# Patient Record
Sex: FEMALE | Race: BLACK OR AFRICAN AMERICAN
[De-identification: names, ages, dates, MRNs, and addresses within clinical notes are randomized per-mention and may not be internally consistent; named-entity substitution may affect disease eponyms.]

---

## 2018-03-03 ENCOUNTER — HOSPITAL ENCOUNTER (OUTPATIENT)
Dept: HOSPITAL 17 - NEPC | Age: 43
Setting detail: OBSERVATION
LOS: 1 days | Discharge: PSYCHIATRIC OR SUBSTANCE USE FACILITY | End: 2018-03-04
Payer: COMMERCIAL

## 2018-03-03 VITALS
HEART RATE: 75 BPM | SYSTOLIC BLOOD PRESSURE: 120 MMHG | DIASTOLIC BLOOD PRESSURE: 72 MMHG | OXYGEN SATURATION: 98 % | RESPIRATION RATE: 16 BRPM

## 2018-03-03 VITALS
HEART RATE: 75 BPM | TEMPERATURE: 98.5 F | RESPIRATION RATE: 18 BRPM | SYSTOLIC BLOOD PRESSURE: 121 MMHG | OXYGEN SATURATION: 96 % | DIASTOLIC BLOOD PRESSURE: 82 MMHG

## 2018-03-03 VITALS
TEMPERATURE: 98.2 F | DIASTOLIC BLOOD PRESSURE: 73 MMHG | SYSTOLIC BLOOD PRESSURE: 123 MMHG | OXYGEN SATURATION: 95 % | HEART RATE: 69 BPM | RESPIRATION RATE: 18 BRPM

## 2018-03-03 VITALS — WEIGHT: 198.42 LBS | HEIGHT: 65 IN | BODY MASS INDEX: 33.06 KG/M2

## 2018-03-03 VITALS — OXYGEN SATURATION: 98 %

## 2018-03-03 DIAGNOSIS — R06.02: ICD-10-CM

## 2018-03-03 DIAGNOSIS — F17.210: ICD-10-CM

## 2018-03-03 DIAGNOSIS — I44.0: ICD-10-CM

## 2018-03-03 DIAGNOSIS — R94.31: ICD-10-CM

## 2018-03-03 DIAGNOSIS — Z85.43: ICD-10-CM

## 2018-03-03 DIAGNOSIS — R20.0: ICD-10-CM

## 2018-03-03 DIAGNOSIS — Z91.5: ICD-10-CM

## 2018-03-03 DIAGNOSIS — F43.21: ICD-10-CM

## 2018-03-03 DIAGNOSIS — F12.90: ICD-10-CM

## 2018-03-03 DIAGNOSIS — R45.851: ICD-10-CM

## 2018-03-03 DIAGNOSIS — R07.89: Primary | ICD-10-CM

## 2018-03-03 LAB
ALBUMIN SERPL-MCNC: 3.8 GM/DL (ref 3.4–5)
ALP SERPL-CCNC: 82 U/L (ref 45–117)
ALT SERPL-CCNC: 17 U/L (ref 10–53)
APAP SERPL-MCNC: (no result) MCG/ML (ref 10–30)
AST SERPL-CCNC: 13 U/L (ref 15–37)
BASOPHILS # BLD AUTO: 0 TH/MM3 (ref 0–0.2)
BASOPHILS NFR BLD: 0.8 % (ref 0–2)
BILIRUB SERPL-MCNC: 0.5 MG/DL (ref 0.2–1)
BUN SERPL-MCNC: 10 MG/DL (ref 7–18)
CALCIUM SERPL-MCNC: 9.1 MG/DL (ref 8.5–10.1)
CHLORIDE SERPL-SCNC: 106 MEQ/L (ref 98–107)
CREAT SERPL-MCNC: 0.78 MG/DL (ref 0.5–1)
EOSINOPHIL # BLD: 0.1 TH/MM3 (ref 0–0.4)
EOSINOPHIL NFR BLD: 1.7 % (ref 0–4)
ERYTHROCYTE [DISTWIDTH] IN BLOOD BY AUTOMATED COUNT: 14.4 % (ref 11.6–17.2)
GFR SERPLBLD BASED ON 1.73 SQ M-ARVRAT: 97 ML/MIN (ref 89–?)
GLUCOSE SERPL-MCNC: 81 MG/DL (ref 74–106)
HCO3 BLD-SCNC: 22.8 MEQ/L (ref 21–32)
HCT VFR BLD CALC: 47.5 % (ref 35–46)
HGB BLD-MCNC: 15.9 GM/DL (ref 11.6–15.3)
LYMPHOCYTES # BLD AUTO: 1.7 TH/MM3 (ref 1–4.8)
LYMPHOCYTES NFR BLD AUTO: 33.4 % (ref 9–44)
MCH RBC QN AUTO: 28.9 PG (ref 27–34)
MCHC RBC AUTO-ENTMCNC: 33.6 % (ref 32–36)
MCV RBC AUTO: 86.1 FL (ref 80–100)
MONOCYTE #: 0.4 TH/MM3 (ref 0–0.9)
MONOCYTES NFR BLD: 8.6 % (ref 0–8)
NEUTROPHILS # BLD AUTO: 2.8 TH/MM3 (ref 1.8–7.7)
NEUTROPHILS NFR BLD AUTO: 55.5 % (ref 16–70)
PLATELET # BLD: 370 TH/MM3 (ref 150–450)
PMV BLD AUTO: 7.9 FL (ref 7–11)
PROT SERPL-MCNC: 8.4 GM/DL (ref 6.4–8.2)
RBC # BLD AUTO: 5.52 MIL/MM3 (ref 4–5.3)
SODIUM SERPL-SCNC: 136 MEQ/L (ref 136–145)
TROPONIN I SERPL-MCNC: (no result) NG/ML (ref 0.02–0.05)
TROPONIN I SERPL-MCNC: (no result) NG/ML (ref 0.02–0.05)
WBC # BLD AUTO: 5.1 TH/MM3 (ref 4–11)

## 2018-03-03 PROCEDURE — 80307 DRUG TEST PRSMV CHEM ANLYZR: CPT

## 2018-03-03 PROCEDURE — 80053 COMPREHEN METABOLIC PANEL: CPT

## 2018-03-03 PROCEDURE — 71045 X-RAY EXAM CHEST 1 VIEW: CPT

## 2018-03-03 PROCEDURE — 85025 COMPLETE CBC W/AUTO DIFF WBC: CPT

## 2018-03-03 PROCEDURE — 84702 CHORIONIC GONADOTROPIN TEST: CPT

## 2018-03-03 PROCEDURE — 84484 ASSAY OF TROPONIN QUANT: CPT

## 2018-03-03 PROCEDURE — 99285 EMERGENCY DEPT VISIT HI MDM: CPT

## 2018-03-03 PROCEDURE — 93005 ELECTROCARDIOGRAM TRACING: CPT

## 2018-03-03 PROCEDURE — G0378 HOSPITAL OBSERVATION PER HR: HCPCS

## 2018-03-03 PROCEDURE — 93017 CV STRESS TEST TRACING ONLY: CPT

## 2018-03-03 PROCEDURE — 84443 ASSAY THYROID STIM HORMONE: CPT

## 2018-03-03 PROCEDURE — 82550 ASSAY OF CK (CPK): CPT

## 2018-03-03 NOTE — RADRPT
EXAM DATE/TIME:  03/03/2018 18:08 

 

[facility] COMPARISON:     

No previous studies available for comparison.

 

                     

INDICATIONS :     

Patient complains of chest pain and shortness of breath. 

                     

 

MEDICAL HISTORY :     

None.          

 

SURGICAL HISTORY :     

None.   

 

ENCOUNTER:     

Initial                                        

 

ACUITY:     

1 day      

 

PAIN SCORE:     

4/10

 

LOCATION:      

chest 

 

FINDINGS:     

A single view of the chest demonstrates the lungs to be symmetrically aerated without evidence of mas
s, infiltrate or effusion.  The cardiomediastinal contours are unremarkable.  Osseous structures are 
intact.

 

CONCLUSION:     No acute disease.  

 

 

 

 Aditya Salazar MD on March 03, 2018 at 18:54           

Board Certified Radiologist.

 This report was verified electronically.

## 2018-03-03 NOTE — PD
Data


Data


Last Documented VS





Vital Signs








  Date Time  Temp Pulse Resp B/P (MAP) Pulse Ox O2 Delivery O2 Flow Rate FiO2


 


3/3/18 19:26  75 16 120/72 (88) 98 Room Air  


 


3/3/18 17:24 98.5       








Orders





 Orders


Complete Blood Count With Diff (3/3/18 17:24)


Comprehensive Metabolic Panel (3/3/18 17:24)


Thyroid Stimulating Hormone (3/3/18 17:24)


Electrocardiogram (3/3/18 17:24)


Oximetry (3/3/18 17:24)


Ecg Monitoring (3/3/18 17:24)


Beta Hcg (Quant/Titer) (3/3/18 17:24)


Psych Screen (3/3/18 17:24)


Drug Screen, Random Urine (3/3/18 17:24)


Alcohol (Ethanol) (3/3/18 17:24)


Salicylates (Aspirin) (3/3/18 17:24)


Tylenol (Acetaminophen) (3/3/18 17:24)


Chest, Single Ap (3/3/18 17:43)


Aspirin Chew (Aspirin Chew) (3/3/18 19:15)


Ckmb (Isoenzyme) Profile (3/3/18 19:05)


Troponin I (3/3/18 19:05)


Admit Order (Ed Use Only) (3/3/18 20:03)





Labs





Laboratory Tests








Test


  3/3/18


17:30 3/3/18


18:25 3/3/18


19:05


 


White Blood Count 5.1 TH/MM3   


 


Red Blood Count 5.52 MIL/MM3   


 


Hemoglobin 15.9 GM/DL   


 


Hematocrit 47.5 %   


 


Mean Corpuscular Volume 86.1 FL   


 


Mean Corpuscular Hemoglobin 28.9 PG   


 


Mean Corpuscular Hemoglobin


Concent 33.6 % 


  


  


 


 


Red Cell Distribution Width 14.4 %   


 


Platelet Count 370 TH/MM3   


 


Mean Platelet Volume 7.9 FL   


 


Neutrophils (%) (Auto) 55.5 %   


 


Lymphocytes (%) (Auto) 33.4 %   


 


Monocytes (%) (Auto) 8.6 %   


 


Eosinophils (%) (Auto) 1.7 %   


 


Basophils (%) (Auto) 0.8 %   


 


Neutrophils # (Auto) 2.8 TH/MM3   


 


Lymphocytes # (Auto) 1.7 TH/MM3   


 


Monocytes # (Auto) 0.4 TH/MM3   


 


Eosinophils # (Auto) 0.1 TH/MM3   


 


Basophils # (Auto) 0.0 TH/MM3   


 


CBC Comment DIFF FINAL   


 


Differential Comment    


 


Urine Opiates Screen  NEG  


 


Urine Barbiturates Screen  NEG  


 


Urine Amphetamines Screen  NEG  


 


Urine Benzodiazepines Screen  NEG  


 


Urine Cocaine Screen  NEG  


 


Urine Cannabinoids Screen  POS  


 


Blood Urea Nitrogen   10 MG/DL 


 


Creatinine   0.78 MG/DL 


 


Random Glucose   81 MG/DL 


 


Total Protein   8.4 GM/DL 


 


Albumin   3.8 GM/DL 


 


Calcium Level   9.1 MG/DL 


 


Alkaline Phosphatase   82 U/L 


 


Aspartate Amino Transf


(AST/SGOT) 


  


  13 U/L 


 


 


Alanine Aminotransferase


(ALT/SGPT) 


  


  17 U/L 


 


 


Total Bilirubin   0.5 MG/DL 


 


Sodium Level   136 MEQ/L 


 


Potassium Level   3.9 MEQ/L 


 


Chloride Level   106 MEQ/L 


 


Carbon Dioxide Level   22.8 MEQ/L 


 


Anion Gap   7 MEQ/L 


 


Estimat Glomerular Filtration


Rate 


  


  97 ML/MIN 


 


 


Total Creatine Kinase   91 U/L 


 


Troponin I


  


  


  LESS THAN 0.02


NG/ML


 


Thyroid Stimulating Hormone


3rd Gen 


  


  1.390 uIU/ML 


 


 


Human Chorionic Gonadotropin,


Quant 


  


  2 MIU/ML 


 


 


Salicylates Level   1.8 MG/DL 


 


Acetaminophen Level


  


  


  LESS THAN 2.0


MCG/ML


 


Ethyl Alcohol Level


  


  


  LESS THAN 3


MG/DL











MDM


Supervised Visit with ERMIAS:  No


Narrative Course


The patient was initially evaluated by the previous provider and signed out to 

me at the beginning of my shift pending labs and disposition.  He is no for 

further details.





Briefly this is a 42-year-old female who is brought in under Baker act, however 

is also complained of chest pain.  According to the [place] act the patient 

stated that she was done with it all and wanted to die.  The patient is denying 

suicidal ideation.  She states that she was in an argument with her boyfriend, 

and stated that she felt tired.  She has no thoughts of suicide.  She states 

that at the time she was having substernal chest pressure, left arm heaviness 

and left arm numbness.  She denies history of cardiac disease.  The previous 

provider felt that if her cardiac workup was negative, that she should be 

admitted to the chest pain center for further cardiac evaluation.  Patient's 

EKG shows no signs of ischemia.  CBC shows slight hemoconcentration with a 

hemoglobin of 15.9, CMP is unremarkable, cardiac enzymes are negative.  Chest x-

ray shows no acute disease.  The patient was given aspirin and was made aware 

of all findings.  She is amenable to plan to being admitted to the chest pain 

center.


Diagnosis





 Primary Impression:  


 Chest pain rule out MI


 Additional Impression:  


 [place] act





Admitting Information


Admitting Physician Requests:  Observation











Spencer Chance MD Mar 3, 2018 20:05

## 2018-03-03 NOTE — PD
HPI


Chief Complaint:  Chest Pain


Time Seen by Provider:  17:24


Travel History


International Travel<30 days:  No


Contact w/Intl Traveler<30days:  No


Traveled to known affect area:  No





History of Present Illness


HPI


Patient is brought in as a baker act by rowan due to a call boyfriend made, 

in which she alleged that she could not take this anymore and she wanted to 

die.  Based on those comments 's went ahead and [place] acted the patient, 

patient is a legal gun carrier and there was a gone and glove compartment of 

the vehicle.  During transportation the patient started to complain of chest 

pain substernal and radiated to left arm, 5 out of 10, currently patient is 

symptom-free.











No known drug allergy


The current primary care physician


Previous appendectomy, history of depression, history of suicide attempt back 

in at least about 10 years ago on pain pills, history of violent behavior as 

well.





PFSH


Past Medical History


Depression:  Yes


Diminished Hearing:  No





Past Surgical History


Appendectomy:  Yes


Ear Surgery:  Yes


Other Surgery:  Yes (TENDON)





Social History


Alcohol Use:  No


Tobacco Use:  No (QUIT 12/2/08)


Substance Use:  No





Allergies-Medications


(Allergen,Severity, Reaction):  


Coded Allergies:  


     No Known Allergies (Verified , 11/29/11)


Reported Meds & Prescriptions





Reported Meds & Active Scripts


Active


Reported


Aleve Arthritis (Naproxen Sodium) 220 Mg Tab 220 Mg PO BID 








Review of Systems


General / Constitutional:  No: Fever


Eyes:  No: Visual changes


HENT:  No: Headaches


Cardiovascular:  Positive: Chest Pain or Discomfort


Respiratory:  No: Shortness of Breath


Gastrointestinal:  No: Abdominal Pain


Genitourinary:  No: Dysuria


Musculoskeletal:  No: Pain


Skin:  No Rash


Neurologic:  No: Weakness


Psychiatric:  No: Depression


Endocrine:  No: Polydipsia


Hematologic/Lymphatic:  No: Easy Bruising





Physical Exam


Narrative


GENERAL: -American female noted to be in no distress


SKIN: Warm and dry.


HEAD: Atraumatic. Normocephalic. 


EYES: Pupils equal and round. No scleral icterus. No injection or drainage. 


ENT: No nasal bleeding or discharge.  Mucous membranes pink and moist.


NECK: Trachea midline. No JVD. 


CARDIOVASCULAR: Regular rate and rhythm.  


RESPIRATORY: No accessory muscle use. Clear to auscultation. Breath sounds 

equal bilaterally. 


GASTROINTESTINAL: Abdomen soft, non-tender, nondistended. 


MUSCULOSKELETAL: Extremities without clubbing, cyanosis, or edema. No obvious 

deformities. 


NEUROLOGICAL: Awake and alert. No obvious cranial nerve deficits.  Motor 

grossly within normal limits. Five out of 5 muscle strength in the arms and 

legs.  Normal speech.


PSYCHIATRIC: Appropriate mood and affect; insight and judgment normal.





Data


Data


Last Documented VS





Vital Signs








  Date Time  Temp Pulse Resp B/P (MAP) Pulse Ox O2 Delivery O2 Flow Rate FiO2


 


3/3/18 17:25     100 Room Air  


 


3/3/18 17:24 98.5 75 18 121/82 (95)    








Orders





 Orders


Complete Blood Count With Diff (3/3/18 17:24)


Comprehensive Metabolic Panel (3/3/18 17:24)


Thyroid Stimulating Hormone (3/3/18 17:24)


Electrocardiogram (3/3/18 17:24)


Oximetry (3/3/18 17:24)


Ecg Monitoring (3/3/18 17:24)


Beta Hcg (Quant/Titer) (3/3/18 17:24)


Psych Screen (3/3/18 17:24)


Drug Screen, Random Urine (3/3/18 17:24)


Alcohol (Ethanol) (3/3/18 17:24)


Salicylates (Aspirin) (3/3/18 17:24)


Tylenol (Acetaminophen) (3/3/18 17:24)


Chest, Single Ap (3/3/18 17:43)





Labs





Laboratory Tests








Test


  3/3/18


17:30 3/3/18


18:25


 


White Blood Count 5.1 TH/MM3  


 


Red Blood Count 5.52 MIL/MM3  


 


Hemoglobin 15.9 GM/DL  


 


Hematocrit 47.5 %  


 


Mean Corpuscular Volume 86.1 FL  


 


Mean Corpuscular Hemoglobin 28.9 PG  


 


Mean Corpuscular Hemoglobin


Concent 33.6 % 


  


 


 


Red Cell Distribution Width 14.4 %  


 


Platelet Count 370 TH/MM3  


 


Mean Platelet Volume 7.9 FL  


 


Neutrophils (%) (Auto) 55.5 %  


 


Lymphocytes (%) (Auto) 33.4 %  


 


Monocytes (%) (Auto) 8.6 %  


 


Eosinophils (%) (Auto) 1.7 %  


 


Basophils (%) (Auto) 0.8 %  


 


Neutrophils # (Auto) 2.8 TH/MM3  


 


Lymphocytes # (Auto) 1.7 TH/MM3  


 


Monocytes # (Auto) 0.4 TH/MM3  


 


Eosinophils # (Auto) 0.1 TH/MM3  


 


Basophils # (Auto) 0.0 TH/MM3  


 


CBC Comment DIFF FINAL  


 


Differential Comment   











MDM


Medical Decision Making


Medical Screen Exam Complete:  Yes


Emergency Medical Condition:  Yes


Medical Record Reviewed:  Yes


Interpretation(s)


EKG shows normal sinus rhythm 80 bpm, normal intervals, nonspecific ST-T wave 

changes particularly on lead III, no evidence of any STEMI pattern.


Differential Diagnosis


Questionable malingering secondary gain versus STEMI versus non-STEMI versus 

atypical chest pain


Narrative Course


Due to patient description of her chest pain, patient will be admitted to chest 

pain center for further rule out.  Upon completion of the chest pain rule out 

patient can then begin psychiatric evaluation





Diagnosis





 Primary Impression:  


 Chest pain rule out MI


 Additional Impression:  


 Hector Bowser MD Mar 3, 2018 17:33

## 2018-03-04 ENCOUNTER — HOSPITAL ENCOUNTER (INPATIENT)
Dept: HOSPITAL 17 - H260 | Age: 43
LOS: 1 days | Discharge: HOME | DRG: 881 | End: 2018-03-05
Attending: PSYCHIATRY & NEUROLOGY | Admitting: PSYCHIATRY & NEUROLOGY
Payer: SELF-PAY

## 2018-03-04 VITALS
RESPIRATION RATE: 17 BRPM | OXYGEN SATURATION: 98 % | TEMPERATURE: 98.1 F | DIASTOLIC BLOOD PRESSURE: 68 MMHG | HEART RATE: 68 BPM | SYSTOLIC BLOOD PRESSURE: 120 MMHG

## 2018-03-04 VITALS
OXYGEN SATURATION: 99 % | TEMPERATURE: 98.6 F | RESPIRATION RATE: 18 BRPM | HEART RATE: 82 BPM | SYSTOLIC BLOOD PRESSURE: 108 MMHG | DIASTOLIC BLOOD PRESSURE: 78 MMHG

## 2018-03-04 VITALS — WEIGHT: 198.2 LBS | HEIGHT: 65 IN | BODY MASS INDEX: 33.02 KG/M2

## 2018-03-04 VITALS
SYSTOLIC BLOOD PRESSURE: 111 MMHG | HEART RATE: 79 BPM | DIASTOLIC BLOOD PRESSURE: 71 MMHG | OXYGEN SATURATION: 97 % | TEMPERATURE: 98.2 F | RESPIRATION RATE: 16 BRPM

## 2018-03-04 VITALS
OXYGEN SATURATION: 98 % | TEMPERATURE: 98 F | RESPIRATION RATE: 18 BRPM | DIASTOLIC BLOOD PRESSURE: 72 MMHG | HEART RATE: 80 BPM | SYSTOLIC BLOOD PRESSURE: 132 MMHG

## 2018-03-04 VITALS
OXYGEN SATURATION: 97 % | TEMPERATURE: 98.4 F | SYSTOLIC BLOOD PRESSURE: 97 MMHG | RESPIRATION RATE: 18 BRPM | HEART RATE: 70 BPM | DIASTOLIC BLOOD PRESSURE: 63 MMHG

## 2018-03-04 DIAGNOSIS — F12.90: ICD-10-CM

## 2018-03-04 DIAGNOSIS — F10.10: ICD-10-CM

## 2018-03-04 DIAGNOSIS — Z91.410: ICD-10-CM
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
## 2018-03-04 DIAGNOSIS — F43.21: Primary | ICD-10-CM

## 2018-03-04 DIAGNOSIS — Z91.5: ICD-10-CM

## 2018-03-04 LAB — TROPONIN I SERPL-MCNC: (no result) NG/ML (ref 0.02–0.05)

## 2018-03-04 PROCEDURE — 80048 BASIC METABOLIC PNL TOTAL CA: CPT

## 2018-03-04 PROCEDURE — 80061 LIPID PANEL: CPT

## 2018-03-04 PROCEDURE — 83036 HEMOGLOBIN GLYCOSYLATED A1C: CPT
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
## 2018-03-04 NOTE — HHI.HP
HPI


Primary Care Physician


No Primary Care Physician


Chief Complaint


Chest pain and Baker act


History of Present Illness


This is a 42-year-old female the presents to ED with complaint of chest 

discomfort.  She also is under Baker act.  She states that she was arguing with 

her boyfriend when the chest discomfort began.  Describes the left-sided 

tightness lasted about 15 minutes.  She was short of breath and maybe a little 

diaphoretic.  She said her boyfriend she is tired of dealing with this and 

admits to saying that she wanted to die.  States she truly does not have a 

suicidal thought.  She does have a concealed weapon permit to carry a gun and 

apparently the gun was in the glove box.  Her boyfriend called 911 and she was 

[place] acted.  The chest discomfort has not recurred.  She then states that she 

has had issues with costochondritis for years.  Denies hypertension, 

hyperlipidemia, diabetes, or CAD.  She smokes 2-3 cigarettes a day.  Denies 

family history of CAD.  Patient also states that she was at [facility] yesterday for pain in her right leg that she has had for 2 months.  

She states x-rays were taken and everything was fine.  She states she injured 

it while she was doing kickboxing 2 months ago.  Denies swelling in her legs.  

Patient states she has had a suicide attempt approximately 10 years ago.  

Denies other psychiatric issues.


 (Louis Porter)





Review of Systems


General: Patient denies fevers, chills, and recent travel


HEENT: Patient denies headache, sore throat, difficulty swallowing.  


Cardiovascular: Has the chest discomfort as mentioned above.  Denies sensation 

of heart beating rapidly or irregularly.  No syncope.  Mild diaphoresis.


Respiratory: She was short of breath.  Denies inspirational chest discomfort.  

Denies coughing wheezing or hemoptysis.  


GI: Patient denies nausea, vomiting, diarrhea, abdominal pain, bloody stools.


Musculoskeletal: Complains of pain in her right leg and points just below the 

right knee on anterior aspect.  Patient denies joint pain or edema.  Denies 

calf pain or edema.


Neurovascular: Patient denies numbness, tingling, weakness in extremities.  

Denies headache.


Endocrine: Denies polyuria and polydipsia.


Hematologic: Denies easy bruising.


Skin: Denies rash or itching.


 (Louis Porter)





Past Family Social History


Allergies:  


Coded Allergies:  


     No Known Allergies (Verified  Adverse Reaction, Unknown, 3/3/18)


Past Medical History


Ovarian cancer in 2015 with hysterectomy.  Tobacco abuse.  Denies hypertension, 

hyperlipidemia, diabetes, and CAD.


Past Surgical History


Hysterectomy in .


Reported Medications





Reported Meds & Active Scripts


Active


Active Ordered Medications





Current Medications








 Medications


  (Trade)  Dose


 Ordered  Sig/Jigna


 Route  Start Time


 Stop Time Status Last Admin


 


  (NS Flush)  2 ml  UNSCH  PRN


 IV FLUSH  3/3/18 21:00


     


 


 


  (NS Flush)  2 ml  BID


 IV FLUSH  3/3/18 21:00


    3/3/18 21:00


 








Family History


Denies family history of CAD.


Social History


Smokes 2-3 cigarettes per day.  Smokes marijuana 2-3 times per week.  Has 

occasional alcohol.


 (Louis Porter)





Physical Exam


Vital Signs





Vital Signs








  Date Time  Temp Pulse Resp B/P (MAP) Pulse Ox O2 Delivery O2 Flow Rate FiO2


 


3/4/18 08:07 98.4 70 18 97/63 (74) 97   


 


3/4/18 01:31 98.1 68 17 120/68 (85) 98   


 


3/3/18 21:45 98.2 69 18 123/73 (90) 95   


 


3/3/18 21:28        


 


3/3/18 21:20     98   


 


3/3/18 19:26  75 16 120/72 (88) 98 Room Air  


 


3/3/18 17:25     100 Room Air  


 


3/3/18 17:24 98.5 75 18 121/82 (95) 96   








Physical Exam


GENERAL: This is a well-nourished, well-developed patient, in no apparent 

distress.  Patient speaks in clear complete sentences.  Patient is pleasant.


HEENT: Head is atraumatic and normocephalic.  Neck is supple without 

lymphadenopathy and trachea is midline.  No JVD or carotid bruits.


CARDIOVASCULAR: Regular rate and rhythm without murmurs, gallops, or rubs. 


RESPIRATORY: Clear to auscultation. Breath sounds equal bilaterally. No wheezes

, rales, or rhonchi.  Chest wall is tender.  No use of accessory muscles.


GASTROINTESTINAL: Abdomen is nontender, nondistended.  Abdomen soft.  No 

obvious pulsatile mass or bruit.  No CVA tenderness.  Strong femoral pulses 

bilaterally.  Normal bowel sounds in all quadrants.


MUSCULOSKELETAL: Patient is moving upper and lower extremities freely.  No calf 

tenderness or edema, no Homans sign.  Strong pulses in upper and lower 

extremities.


NEUROLOGICAL: Patient is alert and oriented.  Cranial nerves 2-12 are grossly 

intact.  No focal deficits and speech is clear.


SKIN: No rash and turgor is normal.


Laboratory





Laboratory Tests








Test


  3/3/18


17:30 3/3/18


18:25 3/3/18


19:05 3/3/18


22:55


 


White Blood Count 5.1    


 


Red Blood Count 5.52    


 


Hemoglobin 15.9    


 


Hematocrit 47.5    


 


Mean Corpuscular Volume 86.1    


 


Mean Corpuscular Hemoglobin 28.9    


 


Mean Corpuscular Hemoglobin


Concent 33.6 


  


  


  


 


 


Red Cell Distribution Width 14.4    


 


Platelet Count 370    


 


Mean Platelet Volume 7.9    


 


Neutrophils (%) (Auto) 55.5    


 


Lymphocytes (%) (Auto) 33.4    


 


Monocytes (%) (Auto) 8.6    


 


Eosinophils (%) (Auto) 1.7    


 


Basophils (%) (Auto) 0.8    


 


Neutrophils # (Auto) 2.8    


 


Lymphocytes # (Auto) 1.7    


 


Monocytes # (Auto) 0.4    


 


Eosinophils # (Auto) 0.1    


 


Basophils # (Auto) 0.0    


 


CBC Comment DIFF FINAL    


 


Differential Comment     


 


Urine Opiates Screen  NEG   


 


Urine Barbiturates Screen  NEG   


 


Urine Amphetamines Screen  NEG   


 


Urine Benzodiazepines Screen  NEG   


 


Urine Cocaine Screen  NEG   


 


Urine Cannabinoids Screen  POS   


 


Blood Urea Nitrogen   10  


 


Creatinine   0.78  


 


Random Glucose   81  


 


Total Protein   8.4  


 


Albumin   3.8  


 


Calcium Level   9.1  


 


Alkaline Phosphatase   82  


 


Aspartate Amino Transf


(AST/SGOT) 


  


  13 


  


 


 


Alanine Aminotransferase


(ALT/SGPT) 


  


  17 


  


 


 


Total Bilirubin   0.5  


 


Sodium Level   136  


 


Potassium Level   3.9  


 


Chloride Level   106  


 


Carbon Dioxide Level   22.8  


 


Anion Gap   7  


 


Estimat Glomerular Filtration


Rate 


  


  97 


  


 


 


Total Creatine Kinase   91  81 


 


Troponin I   LESS THAN 0.02  LESS THAN 0.02 


 


Thyroid Stimulating Hormone


3rd Gen 


  


  1.390 


  


 


 


Human Chorionic Gonadotropin,


Quant 


  


  2 


  


 


 


Salicylates Level   1.8  


 


Acetaminophen Level   LESS THAN 2.0  


 


Ethyl Alcohol Level   LESS THAN 3  


 


Test


  3/4/18


01:30 


  


  


 


 


Total Creatine Kinase 79    


 


Troponin I LESS THAN 0.02    








 (Louis Porter)


Result Diagram:  


3/3/18 1730                                                                    

            3/3/18 1905





Imaging





Last 48 hours Impressions








Chest X-Ray 3/3/18 0963 Signed





Impressions: 





 Service Date/Time:  Saturday, March 3, 2018 18:08 - CONCLUSION: No acute 





 disease.       Aditya Salazar MD 








Course


EKGs are sinus rhythm without significant ST segment depressions or elevations.

  There is first-degree AV block.


 (Louis Porter)





Caprini VTE Risk Assessment


Caprini VTE Risk Assessment:  No/Low Risk (score <= 1)


Caprini Risk Assessment Model











 Point Value = 1          Point Value = 2  Point Value = 3  Point Value = 5


 


Age 41-60


Minor surgery


BMI > 25 kg/m2


Swollen legs


Varicose veins


Pregnancy or postpartum


History of unexplained or recurrent


   spontaneous 


Oral contraceptives or hormone


   replacement


Sepsis (< 1 month)


Serious lung disease, including


   pneumonia (< 1 month)


Abnormal pulmonary function


Acute myocardial infarction


Congestive heart failure (< 1 month)


History of inflammatory bowel disease


Medical patient at bed rest Age 61-74


Arthroscopic surgery


Major open surgery (> 45 min)


Laparoscopic surgery (> 45 min)


Malignancy


Confined to bed (> 72 hours)


Immobilizing plaster cast


Central venous access Age >= 75


History of VTE


Family history of VTE


Factor V Leiden


Prothrombin [id number]


Lupus anticoagulant


Anticardiolipin antibodies


Elevated serum homocysteine


Heparin-induced thrombocytopenia


Other congenital or acquired


   thrombophilia Stroke (< 1 month)


Elective arthroplasty


Hip, pelvis, or leg fracture


Acute spinal cord injury (< 1 month)








Prophylaxis Regimen











   Total Risk


Factor Score Risk Level Prophylaxis Regimen


 


0-1      Low Early ambulation


 


2 Moderate Order ONE of the following:


*Sequential Compression Device (SCD)


*Heparin 5000 units SQ BID


 


3-4 Higher Order ONE of the following medications:


*Heparin 5000 units SQ TID


*Enoxaparin/Lovenox 40 mg SQ daily (WT < 150 kg, CrCl > 30 mL/min)


*Enoxaparin/Lovenox 30 mg SQ daily (WT < 150 kg, CrCl > 10-29 mL/min)


*Enoxaparin/Lovenox 30 mg SQ BID (WT < 150 kg, CrCl > 30 mL/min)


AND/OR


*Sequential Compression Device (SCD)


 


5 or more Highest Order ONE of the following medications:


*Heparin 5000 units SQ TID (Preferred with Epidurals)


*Enoxaparin/Lovenox 40 mg SQ daily (WT < 150 kg, CrCl > 30 mL/min)


*Enoxaparin/Lovenox 30 mg SQ daily (WT < 150 kg, CrCl > 10-29 mL/min)


*Enoxaparin/Lovenox 30 mg SQ BID (WT < 150 kg, CrCl > 30 mL/min)


AND


*Sequential Compression Device (SCD)








 (Louis Porter)





Assessment and Plan


Assessment and Plan





* Chest pain: Her symptoms appear atypical.  She has had serial cardiac enzymes 

and EKGs for ruling out purposes.  She has been seen by Dr. Das of 

cardiology in the chest pain center and will have a Jose protocol ETT.  His 

stress test is nonischemic she would be medically cleared and then transferred 

to psych unit.


* Trinidad act: Patient admits to making a statement that she wanted to die.  

States she does not have a plan however she does have a weapon.  I discussed 

the patient with Dr. Vasquez, he has evaluated the patient.  He states that 

after she has been medically clear that we can admit the patient to psychiatry.


* Tobacco abuse: Patient has been counseled on the importance of smoking 

cessation.


Patient is stable at this time.  She is agreeable to this plan.





Patient has been medically cleared and will be admitted to psychiatry under the 

care of Dr Vasquez.


 (Louis Porter)


Assessment and Plan


Addendum by Dr. Das


Patient's medical chart was reviewed, discussed with PA, seen and examined 

personally.  I am in agreement with the documentation as given above.


Patient's ongoing episodes of chest pain are very atypical for cardiovascular 

disease.  She has known history of costochondritis, musculoskeletal chest pain 

however pain yesterday it was slightly different and obviously stress produced.

  In spite of low probability of cardiovascular etiology we will evaluate her 

with an exercise stress test to be sure she is clear medically for transfer to 

psychiatry.


 (Ochoa Das MD)











Louis Porter Mar 4, 2018 08:53


Ochoa Das MD Mar 4, 2018 09:38

## 2018-03-04 NOTE — EKG
Date Performed: 03/04/2018       Time Performed: 01:29:02

 

PTAGE:      42 years

 

EKG:      Sinus rhythm 

 

 WITH FIRST DEGREE AV BLOCK ABNORMAL ECG NO CHANGE 

 

 PREVIOUS TRACING            : 03/03/2018 22.10

 

DOCTOR:   Ochoa Das  Interpretating Date/Time  03/04/2018 15:47:40

## 2018-03-04 NOTE — HHI.DCPOC
Discharge Care Plan


Diagnosis:  


(1) Adjustment disorder with depressed mood


(2) Chest pain, atypical


(3) Tobacco abuse


Goals to Promote Your Health


* To prevent worsening of your condition and complications


* To maintain your health at the optimal level


Directions to Meet Your Goals


*** Take your medications as prescribed


*** Follow your dietary instruction


*** Follow activity as directed








*** Keep your appointments as scheduled


*** Take your immunizations and boosters as scheduled


*** If your symptoms worsen call your PCP, if no PCP go to Urgent Care Center 

or Emergency Room***


*** Smoking is Dangerous to Your Health. Avoid second hand smoke***


***Call the 24-hour hour crisis hotline for domestic abuse at 1-350.455.7304***











Louis Porter Mar 4, 2018 10:19

## 2018-03-04 NOTE — EKG
Date Performed: 03/03/2018       Time Performed: 22:10:27

 

PTAGE:      42 years

 

EKG:      Sinus rhythm 

 

 WITH FIRST DEGREE AV BLOCK ABNORMAL ECG NO CHANGE 

 

 PREVIOUS TRACING            : 03/03/2018 21.01

 

DOCTOR:   Ochoa Das  Interpretating Date/Time  03/04/2018 15:47:58

## 2018-03-04 NOTE — EKG
Date Performed: 03/03/2018       Time Performed: 21:01:52

 

PTAGE:      42 years

 

EKG:      Sinus rhythm 

 

 WITH FIRST DEGREE AV BLOCK ABNORMAL ECG NO SIG CHANGE 

 

 PREVIOUS TRACING            : 03/03/2018 17.30

 

DOCTOR:   Ochoa Das  Interpretating Date/Time  03/04/2018 15:48:28

## 2018-03-04 NOTE — EKG
Date Performed: 03/03/2018       Time Performed: 17:30:03

 

PTAGE:      42 years

 

EKG:      Sinus rhythm 

 

 NORMAL ECG 

 

NO PREVIOUS TRACING            

 

DOCTOR:   Pranay Mccoy  Interpretating Date/Time  03/04/2018 10:40:51

## 2018-03-05 VITALS
SYSTOLIC BLOOD PRESSURE: 105 MMHG | DIASTOLIC BLOOD PRESSURE: 57 MMHG | HEART RATE: 90 BPM | TEMPERATURE: 97.6 F | RESPIRATION RATE: 16 BRPM | OXYGEN SATURATION: 92 %

## 2018-03-05 LAB
BUN SERPL-MCNC: 11 MG/DL (ref 7–18)
CALCIUM SERPL-MCNC: 9.3 MG/DL (ref 8.5–10.1)
CHLORIDE SERPL-SCNC: 101 MEQ/L (ref 98–107)
CHOLEST SERPL-MCNC: 184 MG/DL (ref 120–200)
CHOLESTEROL/ HDL RATIO: 3.32 RATIO
CREAT SERPL-MCNC: 0.87 MG/DL (ref 0.5–1)
GFR SERPLBLD BASED ON 1.73 SQ M-ARVRAT: 86 ML/MIN (ref 89–?)
GLUCOSE SERPL-MCNC: 90 MG/DL (ref 74–106)
HBA1C MFR BLD: 5.4 % (ref 4.3–6)
HCO3 BLD-SCNC: 26 MEQ/L (ref 21–32)
HDLC SERPL-MCNC: 55.3 MG/DL (ref 40–60)
LDLC SERPL-MCNC: 112 MG/DL (ref 0–99)
SODIUM SERPL-SCNC: 136 MEQ/L (ref 136–145)
TRIGL SERPL-MCNC: 86 MG/DL (ref 42–150)
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
## 2018-03-06 NOTE — TR
Date Performed: 03/04/2018       Time Performed: 09:55:27

 

DOCTOR:      Fe Nj 

 

DRUG LIST:     

CLINICAL HISTORY:     

REASON FOR TEST:      Chest pain

REASON FOR ENDING:     

OBSERVATION:     

CONCLUSION:      CLAY PROTOCOL ETT. NO CP OR SOB. TEST STOPPED AFTER EXCEEDING GOAL HR SECONDARY TO 
LEG FATIGUE. NO ST CHANGES TO SUGGEST ISHEMIA.Maximum BF=879 % Max HR Achieved=91.0% Maximum TF=811/6
4 Total Exercise Time=4:46

COMMENTS:      No ischemia